# Patient Record
Sex: MALE | Employment: OTHER | URBAN - METROPOLITAN AREA
[De-identification: names, ages, dates, MRNs, and addresses within clinical notes are randomized per-mention and may not be internally consistent; named-entity substitution may affect disease eponyms.]

---

## 2018-06-28 ENCOUNTER — OFFICE VISIT (OUTPATIENT)
Dept: ORTHOPEDIC SURGERY | Age: 48
End: 2018-06-28

## 2018-06-28 VITALS
BODY MASS INDEX: 26.81 KG/M2 | DIASTOLIC BLOOD PRESSURE: 81 MMHG | OXYGEN SATURATION: 97 % | WEIGHT: 181 LBS | SYSTOLIC BLOOD PRESSURE: 128 MMHG | HEART RATE: 67 BPM | RESPIRATION RATE: 16 BRPM | TEMPERATURE: 96.5 F | HEIGHT: 69 IN

## 2018-06-28 DIAGNOSIS — M51.16 LUMBAR DISC DISEASE WITH RADICULOPATHY: ICD-10-CM

## 2018-06-28 DIAGNOSIS — M25.551 PAIN OF RIGHT HIP JOINT: Primary | ICD-10-CM

## 2018-06-28 DIAGNOSIS — M76.11 PSOAS TENDINITIS OF RIGHT SIDE: ICD-10-CM

## 2018-06-28 NOTE — PROGRESS NOTES
HISTORY OF PRESENT ILLNESS:  Damion Kelley is a 43-year-old gentleman who is here for consultation regarding some pain \"in his right hip. \"  On further questioning, he has had an episode of this before and his pain started on Friday. 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 He has seen Dr. Araceli Samson in the past for his back. He has been on Neurontin, Mobic, Flexeril, as well as an Lidoderm patches. He points to pain in the lower portion of the rib cage down to the proximal portion of his right thigh. He states the pain was severe. He was not able to move very well or sleep. He also noted some popping in the right hip. He started taking some Vicodin medication on Friday night as well as started to Mobic. Today he is much better than what he was. He notes minimal discomfort now but was concerned regarding the source of the pain. He describes his pain occasionally a sharp, stabbing pain but was concerned regarding the popping sound that he felt. He states the popping in his hip does not cause him any pain whatsoever. It is more the radiating pain in his lower chest down towards the proximal right thigh. Currently does not utilize any ambulatory assist.    PHYSICAL EXAMINATION:  Clinical examination reveals healthy appearing, very athletic 43-year-old  gentleman in no obvious discomfort. He moves easily on and off the examination table. With reference to his left hip, he is able to straight leg raise to 90 degrees today without discomfort. This does not reproduce pain on the right side. He has a normal passive and active range of motion of the left hip discomfort. Left hip roll test is negative. Verlon Ely test is negative on the left side. With reference to his right hip, he is able to straight leg raise to about 80 degrees today without significant discomfort. He has very good passive rotation of the right hip without pain.   Verlon Ely test on the right side results in pain more in the posterior aspect of the right hip. Right hip roll test is negative. Impingement testing is negative. He does not have pain with full flexion, adduction, and internal rotation. Leg lengths appear symmetric in the supine position. Neurovascular testing intact in the lower extremities, proximal and distal, to motor, strength, and sensation. RADIOGRAPHS: X-rays of his hips today reveal no osseous pathology. He has good joint space remaining in the weight-bearing portion of both hips. There is no significant spurring or increased sclerosis. IMPRESSION:    1. Possible iliopsoas tendinitis, right hip. 2. Lumbar disk disease with radiculopathy. RECOMMENDATIONS:  I think probably his symptoms are more related to a pinched nerve than anything else. He describes an excruciating-type pain that has now resolved. He has seen Dr. Nasrin Braxton for his back. We have no records of this. If it occurs to again, he is to notify me. In the meantime, he will discontinue the Vicodin medication though occasionally take the Mobic as necessary. He did have some problem with the elevated kidney function tests when he was taking a lot of Aleve but he has stopped this and his kidney functions are normal, so he takes just an occasional Mobic. At this point, his symptoms are not severe enough that he wants to proceed with therapy. I told him if symptoms recur, he is to notify me. Again, probably would do a lumbar spine workup to see for a pinched nerve in view of the severity of his discomfort when he gets it. His symptoms have been precipitated by excessive motion, twisting, and turning such as what he does at work as well as when he was cleaning an attic not too long ago. All of his questions were answered today.                  Vitals:    06/28/18 0932   BP: 128/81   Pulse: 67   Resp: 16   Temp: 96.5 °F (35.8 °C)   TempSrc: Oral   SpO2: 97%   Weight: 181 lb (82.1 kg)   Height: 5' 9\" (1.753 m) Patient Active Problem List   Diagnosis Code    Neuritis of lower extremity G57.90    Lumbar facet arthropathy (HCC) M46.96    HNP (herniated nucleus pulposus), lumbar M51.26     Patient Active Problem List    Diagnosis Date Noted    Neuritis of lower extremity 05/10/2016    Lumbar facet arthropathy ([facility] [address] 05/10/2016    HNP (herniated nucleus pulposus), lumbar 05/10/2016     Current Outpatient Prescriptions   Medication Sig Dispense Refill    COMPOUNDMAX BASE crea 240 g by Does Not Apply route four (4) times daily. Anti-Inflam Meloxicam 0.09% Topirmate 1% Methocarbamol 2%  Lidocaine 2% Prilocaine 2% 240 g 3    meloxicam (MOBIC) 15 mg tablet Take 15 mg by mouth daily.  HYDROcodone-acetaminophen (NORCO) 5-325 mg per tablet Take  by mouth.  LISINOPRIL PO Take  by mouth.  diclofenac (FLECTOR) 1.3 % pt12 transdermal patch 1 Patch by TransDERmal route two (2) times a day. 60 Patch 2    cyclobenzaprine (FLEXERIL) 10 mg tablet Take  by mouth three (3) times daily as needed for Muscle Spasm(s).  gabapentin (NEURONTIN) 300 mg capsule 1 in the am and 2 in the pm -- taper up as directed  Indications: NEUROPATHIC PAIN 90 Cap 1     Allergies   Allergen Reactions    Latex Hives     Leaves scars on skin     Past Medical History:   Diagnosis Date    Arthritis     Hypertension     Stroke ([facility] [address] 9/2015    event showed on monitor in hospital     History reviewed. No pertinent surgical history.   Family History   Problem Relation Age of Onset   [facility] Arthritis-osteo Mother     Diabetes Father      Social History   Substance Use Topics    Smoking status: Never Smoker    Smokeless tobacco: Never Used    Alcohol use No

## 2019-08-27 ENCOUNTER — HOSPITAL ENCOUNTER (OUTPATIENT)
Dept: GENERAL RADIOLOGY | Age: 49
Discharge: HOME | End: 2019-08-27
Payer: COMMERCIAL

## 2019-08-27 DIAGNOSIS — M25.551 RIGHT HIP PAIN: ICD-10-CM

## 2019-08-27 PROCEDURE — 73502 X-RAY EXAM HIP UNI 2-3 VIEWS: CPT

## 2021-04-01 ENCOUNTER — OFFICE VISIT (OUTPATIENT)
Dept: ORTHOPEDIC SURGERY | Age: 51
End: 2021-04-01
Payer: COMMERCIAL

## 2021-04-01 VITALS
HEIGHT: 69 IN | TEMPERATURE: 97.1 F | BODY MASS INDEX: 29.15 KG/M2 | WEIGHT: 196.8 LBS | OXYGEN SATURATION: 97 % | HEART RATE: 80 BPM

## 2021-04-01 DIAGNOSIS — M25.551 RIGHT HIP PAIN: Primary | ICD-10-CM

## 2021-04-01 PROCEDURE — 99214 OFFICE O/P EST MOD 30 MIN: CPT | Performed by: ORTHOPAEDIC SURGERY

## 2021-04-01 PROCEDURE — 73502 X-RAY EXAM HIP UNI 2-3 VIEWS: CPT | Performed by: ORTHOPAEDIC SURGERY

## 2021-04-01 RX ORDER — MELOXICAM 15 MG/1
15 TABLET ORAL DAILY
Qty: 30 TAB | Refills: 1 | Status: SHIPPED | OUTPATIENT
Start: 2021-04-01

## 2021-04-01 NOTE — PROGRESS NOTES
Patient: Kareem Clayton                MRN: [id number]       SSN: [id number]  YOB: 1970        AGE: 44 y.o. SEX: male  Body mass index is 29.06 kg/m². PCP: Akash Bunn MD  04/01/21    HISTORY:  I had the pleasure of reviewing Emanuel today. He has a couple year history of right groin pain. It is first when he rotates. It started when he was blowing insulation, twisted and felt a click/pop, about two years ago. He has about three of these episodes every couple months. He gets on antiinflammatories and it tends to calm down. He has had some laterally based pain on the hip as well, but he usually points to the groin area where it really bothers him, especially with flexion and rotation of the hip. He has had some back problems off and on, but it is reasonably quiescent. No numbness or tingling and denies shortness of breath or chest pain. Denies fevers, chills, night sweats or weight loss. Denies calf pain. PHYSICAL EXAMINATION:  Minimally antalgic component to the gait. Left hip is noncontributory. The right hip - he has positive impingement test with flexion, adduction, internal rotation. The greater trochanter is not particularly tender. Low back is not tender. SLR is negative. He looks well, well nourished, well developed. He is in good physical condition. No foot drop. IT band/EHL 5/5. Sensation normal.  Calf non tender. Emmanuel Ovi' sign negative. RADIOGRAPHS:  X-rays today, AP pelvis, AP and lateral of the right hip reveals just mild degenerative changes. He has a touch of acetabular impingement. IMPRESSION:  My overall impression is likely labral tear with some impingement with mechanical symptoms. PLAN:  I would recommend MRI with contrast, intraarticular, arthrogram.  There was a discussion with the patient regarding surgery and it was decided that it is not recommended today, but we will base decision on upcoming MRI.     Russell Oliveros Leticia Glass MD        REVIEW OF SYSTEMS:      CON: negative  EYE: negative   ENT: negative  RESP: negative  GI:    negative   :  negative  MSK: Positive  A twelve point review of systems was completed, positives noted and all other systems were reviewed and are negative          Past Medical History:   Diagnosis Date    Arthritis     Hypertension     Stroke ([address] 9/2015    event showed on monitor in hospital       Family History   Problem Relation Age of Onset    Arthritis-osteo Mother     Diabetes Father        Current Outpatient Medications   Medication Sig Dispense Refill    COMPOUNDMAX BASE crea 240 g by Does Not Apply route four (4) times daily. Anti-Inflam Meloxicam 0.09% Topirmate 1% Methocarbamol 2%  Lidocaine 2% Prilocaine 2% 240 g 3    meloxicam (MOBIC) 15 mg tablet Take 15 mg by mouth daily.  LISINOPRIL PO Take  by mouth.  diclofenac (FLECTOR) 1.3 % pt12 transdermal patch 1 Patch by TransDERmal route two (2) times a day. 60 Patch 2    cyclobenzaprine (FLEXERIL) 10 mg tablet Take  by mouth three (3) times daily as needed for Muscle Spasm(s).  HYDROcodone-acetaminophen (NORCO) 5-325 mg per tablet Take  by mouth.  gabapentin (NEURONTIN) 300 mg capsule 1 in the am and 2 in the pm -- taper up as directed  Indications: NEUROPATHIC PAIN 90 Cap 1       Allergies   Allergen Reactions    Latex Hives     Leaves scars on skin       History reviewed. No pertinent surgical history.     Social History     Socioeconomic History    Marital status:      Spouse name: Not on file    Number of children: Not on file    Years of education: Not on file    Highest education level: Not on file   Occupational History    Not on file   Social Needs    Financial resource strain: Not on file    Food insecurity     Worry: Not on file     Inability: Not on file    Transportation needs     Medical: Not on file     Non-medical: Not on file   Tobacco Use    Smoking status: Never Smoker    Smokeless tobacco: Never Used   Substance and Sexual Activity    Alcohol use: No    Drug use: No    Sexual activity: Not on file   Lifestyle    Physical activity     Days per week: Not on file     Minutes per session: Not on file    Stress: Not on file   Relationships    Social connections     Talks on phone: Not on file     Gets together: Not on file     Attends Buddhism service: Not on file     Active member of club or organization: Not on file     Attends meetings of clubs or organizations: Not on file     Relationship status: Not on file    Intimate partner violence     Fear of current or ex partner: Not on file     Emotionally abused: Not on file     Physically abused: Not on file     Forced sexual activity: Not on file   Other Topics Concern    Not on file   Social History Narrative    Not on file       Visit Vitals  Pulse 80   Temp 97.1 °F (36.2 °C) (Temporal)   Ht 5' 9\" (1.753 m)   Wt 89.3 kg (196 lb 12.8 oz)   SpO2 97%   BMI 29.06 kg/m²         PHYSICAL EXAMINATION:  GENERAL: Alert and oriented x3, in no acute distress, well-developed, well-nourished, afebrile. HEART: No JVD. EYES: No scleral icterus   NECK: No significant lymphadenopathy   LUNGS: No respiratory compromise or indrawing  ABDOMEN: Soft, non-tender, non-distended. Electronically signed by:  Cookie Woody MD

## 2021-04-02 ENCOUNTER — TELEPHONE (OUTPATIENT)
Dept: ORTHOPEDIC SURGERY | Age: 51
End: 2021-04-02

## 2021-04-02 NOTE — TELEPHONE ENCOUNTER
Patient is requesting a new rx for hip pain called in to [facility] pharmacy on file. He doesn't feel the anti-inflammatory we prescribed yesterday will help him.

## 2021-04-02 NOTE — TELEPHONE ENCOUNTER
Patient advised he need to try the medication for a couple weeks before changing the medication. Since he just got the medication yesterday please have him try it and if not helping in a couple weeks we can change it then.

## 2021-04-02 NOTE — TELEPHONE ENCOUNTER
I recommend he try the medication for a couple weeks before changing the medication. Since he just got the medication yesterday please have him try it and if not helping in a couple weeks we can change it then.

## 2021-04-08 DIAGNOSIS — M25.551 RIGHT HIP PAIN: ICD-10-CM

## 2021-06-08 ENCOUNTER — TRANSCRIBE ORDER (OUTPATIENT)
Dept: SCHEDULING | Age: 51
End: 2021-06-08

## 2021-06-08 DIAGNOSIS — R09.81 NASAL CONGESTION: Primary | ICD-10-CM

## 2021-07-23 ENCOUNTER — HOSPITAL ENCOUNTER (OUTPATIENT)
Dept: CT IMAGING | Age: 51
Discharge: HOME | End: 2021-07-23
Attending: OTOLARYNGOLOGY
Payer: COMMERCIAL

## 2021-07-23 DIAGNOSIS — R09.81 NASAL CONGESTION: ICD-10-CM

## 2021-07-23 PROCEDURE — 70486 CT MAXILLOFACIAL W/O DYE: CPT
